# Patient Record
Sex: FEMALE | Employment: FULL TIME | URBAN - METROPOLITAN AREA
[De-identification: names, ages, dates, MRNs, and addresses within clinical notes are randomized per-mention and may not be internally consistent; named-entity substitution may affect disease eponyms.]

---

## 2018-09-19 ENCOUNTER — HOSPITAL ENCOUNTER (EMERGENCY)
Age: 23
Discharge: HOME | End: 2018-09-19
Attending: EMERGENCY MEDICINE
Payer: COMMERCIAL

## 2018-09-19 VITALS
DIASTOLIC BLOOD PRESSURE: 82 MMHG | TEMPERATURE: 97.5 F | SYSTOLIC BLOOD PRESSURE: 121 MMHG | RESPIRATION RATE: 16 BRPM | BODY MASS INDEX: 24.33 KG/M2 | OXYGEN SATURATION: 97 % | WEIGHT: 155 LBS | HEIGHT: 67 IN | HEART RATE: 72 BPM

## 2018-09-19 DIAGNOSIS — J02.9 VIRAL PHARYNGITIS: Primary | ICD-10-CM

## 2018-09-19 LAB — S PYO AG THROAT QL: NEGATIVE

## 2018-09-19 PROCEDURE — 87880 STREP A ASSAY W/OPTIC: CPT

## 2018-09-19 PROCEDURE — 99283 EMERGENCY DEPT VISIT LOW MDM: CPT

## 2018-09-19 PROCEDURE — 87081 CULTURE SCREEN ONLY: CPT

## 2018-09-19 RX ORDER — HYDROCODONE BITARTRATE AND ACETAMINOPHEN 5; 325 MG/1; MG/1
1 TABLET ORAL EVERY 6 HOURS PRN
Qty: 12 TABLET | Refills: 0 | Status: SHIPPED | OUTPATIENT
Start: 2018-09-19 | End: 2018-09-24

## 2018-09-19 RX ORDER — FLUOXETINE HYDROCHLORIDE 20 MG/1
40 CAPSULE ORAL DAILY
COMMUNITY

## 2018-09-19 ASSESSMENT — PAIN SCALES - GENERAL: PAINLEVEL_OUTOF10: 7

## 2018-09-19 ASSESSMENT — PAIN DESCRIPTION - LOCATION: LOCATION: THROAT

## 2018-09-19 ASSESSMENT — PAIN DESCRIPTION - PAIN TYPE: TYPE: ACUTE PAIN

## 2018-09-19 ASSESSMENT — PAIN DESCRIPTION - DESCRIPTORS: DESCRIPTORS: SHARP

## 2018-09-19 ASSESSMENT — PAIN DESCRIPTION - FREQUENCY: FREQUENCY: CONTINUOUS

## 2018-09-19 NOTE — ED PROVIDER NOTES
Triage Vitals [09/19/18 0216]   Enc Vitals Group      /82      Pulse 72      Resp 16      Temp 97.5 °F (36.4 °C)      Temp src       SpO2 97 %      Weight 155 lb (70.3 kg)      Height 5' 7\" (1.702 m)      Head Circumference       Peak Flow       Pain Score       Pain Loc       Pain Edu? Excl. in [address]? GENERAL APPEARANCE: A well-developed well-nourished Very pleasant uncomfortable 79-year-old female in mild distress  HEAD: Normocephalic, atraumatic  EYES: Sclera anicteric.no conjunctival injection,  ENT: Mucous membranes moist, no nasal discharge, no stridor, but mild dysphonia, moderate posterior pharyngeal hyperemia without exudate  NECK: Supple, no meningismus, shotty anterior cervical adenopathy,   HEART: RRR without rubs murmurs or gallops  LUNGS:  Clear good air movement no wheezing no retraction or accessory muscle use,  ABDOMEN: Soft, non-tender to palpation, no guarding or rebound. , no mass or distention and no hepatosplenomegaly. Clearly no evidence of an acute abdomen or peritoneal signs at time of exam  SKIN: Warm and dry. Normal color, no rash,  capillary refill less than 2 seconds  MENTAL STATUS: Alert, oriented, interactive,     Nursing note and vital signs reviewed     I have reviewed and interpreted all of the currently available lab results from this visit (if applicable):  Results for orders placed or performed during the hospital encounter of 09/19/18   Strep screen group a throat   Result Value Ref Range    Rapid Strep A Screen Negative Negative        Radiographs (if obtained):  [] Radiologist's Report Reviewed:  No orders to display       [] Discussed with Radiologist:     [] The following radiograph was interpreted by myself in the absence of a radiologist:     EKG (if obtained): (All EKG's are interpreted by myself in the absence of a cardiologist)      MDM:   Patient with viral pharyngitis presents to the ER for evaluation.   She is not at all toxic and will be treated as an outpatient. I'll give her hydrocodone to help with her aches and pains but Motrin was recommended as well and she is to rest in bed and increase fluids and follow-up with the on-call family physician. In the interim if problems develop like vomiting fever or chest pain or other concerning symptoms she is to return to the emergency department for further evaluation and treatment. Patient is reliable and understands these instructions    Final Impression:  1. Viral pharyngitis        Critical Care:       Disposition referral (if applicable):  [facility]) Pre-Services  [phone]          Disposition medications (if applicable):  New Prescriptions    HYDROCODONE-ACETAMINOPHEN (LORCET) 5-325 MG PER TABLET    Take 1 tablet by mouth every 6 hours as needed for Pain for up to 5 days. . Take lowest dose possible to manage pain. Comment: Please note this report has been produced using speech recognition software and may contain errors related to that system including errors in grammar, punctuation, and spelling, as well as words and phrases that may be inappropriate. If there are any questions or concerns please feel free to contact the dictating provider for clarification.       (Please note that portions of this note may have been completed with a voice recognition program. Efforts were made to edit the dictations but occasionally words are mis-transcribed.)    MD Davi Ardon., MD  09/19/18 8759

## 2018-09-21 LAB — S PYO THROAT QL CULT: NORMAL
